# Patient Record
Sex: FEMALE | Race: WHITE | NOT HISPANIC OR LATINO | Employment: OTHER | URBAN - NONMETROPOLITAN AREA
[De-identification: names, ages, dates, MRNs, and addresses within clinical notes are randomized per-mention and may not be internally consistent; named-entity substitution may affect disease eponyms.]

---

## 2023-11-30 ENCOUNTER — TELEPHONE (OUTPATIENT)
Dept: CARDIOLOGY | Facility: CLINIC | Age: 68
End: 2023-11-30
Payer: COMMERCIAL

## 2023-11-30 PROBLEM — I48.0 PAROXYSMAL ATRIAL FIBRILLATION (MULTI): Status: ACTIVE | Noted: 2023-11-30

## 2023-11-30 PROBLEM — Z79.899 HIGH RISK MEDICATION USE: Status: ACTIVE | Noted: 2023-11-30

## 2023-11-30 PROBLEM — E78.2 MIXED HYPERLIPIDEMIA: Status: ACTIVE | Noted: 2023-11-30

## 2023-11-30 RX ORDER — RIVAROXABAN 20 MG/1
20 TABLET, FILM COATED ORAL DAILY
COMMUNITY
Start: 2023-11-10 | End: 2024-06-07 | Stop reason: SDUPTHER

## 2023-11-30 RX ORDER — METOPROLOL SUCCINATE 25 MG/1
25 TABLET, EXTENDED RELEASE ORAL DAILY
COMMUNITY
Start: 2023-06-03 | End: 2024-06-07 | Stop reason: SDUPTHER

## 2023-11-30 RX ORDER — ATORVASTATIN CALCIUM 40 MG/1
40 TABLET, FILM COATED ORAL DAILY
COMMUNITY
End: 2024-06-07 | Stop reason: SDUPTHER

## 2023-11-30 RX ORDER — ESCITALOPRAM OXALATE 20 MG/1
20 TABLET ORAL
COMMUNITY
Start: 2023-09-06

## 2023-11-30 RX ORDER — TRAZODONE HYDROCHLORIDE 50 MG/1
50 TABLET ORAL NIGHTLY
COMMUNITY
Start: 2023-09-06

## 2023-11-30 NOTE — TELEPHONE ENCOUNTER
Patient called and states that she is scheduled for placement of tubes in her ears  on 12/20/2023 and is asking if okay to hold her Xarelto 5 days prior.     TO Dr. Alton Serrano MD

## 2024-06-07 ENCOUNTER — OFFICE VISIT (OUTPATIENT)
Dept: CARDIOLOGY | Facility: CLINIC | Age: 69
End: 2024-06-07
Payer: COMMERCIAL

## 2024-06-07 VITALS
SYSTOLIC BLOOD PRESSURE: 120 MMHG | HEIGHT: 66 IN | WEIGHT: 164 LBS | BODY MASS INDEX: 26.36 KG/M2 | HEART RATE: 62 BPM | DIASTOLIC BLOOD PRESSURE: 88 MMHG

## 2024-06-07 DIAGNOSIS — E78.2 MIXED HYPERLIPIDEMIA: ICD-10-CM

## 2024-06-07 DIAGNOSIS — I48.0 PAROXYSMAL ATRIAL FIBRILLATION (MULTI): Primary | ICD-10-CM

## 2024-06-07 PROCEDURE — 3008F BODY MASS INDEX DOCD: CPT | Performed by: INTERNAL MEDICINE

## 2024-06-07 PROCEDURE — 1159F MED LIST DOCD IN RCRD: CPT | Performed by: INTERNAL MEDICINE

## 2024-06-07 PROCEDURE — 99213 OFFICE O/P EST LOW 20 MIN: CPT | Performed by: INTERNAL MEDICINE

## 2024-06-07 PROCEDURE — 1036F TOBACCO NON-USER: CPT | Performed by: INTERNAL MEDICINE

## 2024-06-07 RX ORDER — METOPROLOL SUCCINATE 25 MG/1
25 TABLET, EXTENDED RELEASE ORAL DAILY
Qty: 90 TABLET | Refills: 3 | Status: SHIPPED | OUTPATIENT
Start: 2024-06-07 | End: 2025-06-07

## 2024-06-07 RX ORDER — ATORVASTATIN CALCIUM 40 MG/1
40 TABLET, FILM COATED ORAL DAILY
Qty: 90 TABLET | Refills: 3 | Status: SHIPPED | OUTPATIENT
Start: 2024-06-07 | End: 2025-06-07

## 2024-06-07 RX ORDER — RIVAROXABAN 20 MG/1
20 TABLET, FILM COATED ORAL DAILY
Qty: 90 TABLET | Refills: 3 | Status: SHIPPED | OUTPATIENT
Start: 2024-06-07 | End: 2025-06-07

## 2024-06-07 NOTE — LETTER
"June 7, 2024     Marti Ashford MD  [address] [facility] [address]    Patient: Karime Nascimento   YOB: 1955   Date of Visit: 6/7/2024       Dear Dr. Marti Ashford MD:    Thank you for referring Karime Nascimento to me for evaluation. Below are my notes for this consultation.  If you have questions, please do not hesitate to call me. I look forward to following your patient along with you.       Sincerely,     Alton Serrano MD      CC: No Recipients  ______________________________________________________________________________________    Subjective   Karime Nascimento is a 69 y.o. female       Chief Complaint    Follow-up          HPI     Patient returns in follow-up of problems as noted.  She is done well.  She states she has had 4 episodes of atrial fibrillation in the last year.  Each 1 only lasted 30 to 60 minutes.  They were minimally symptomatic.  Because of the brief duration and infrequent nature of these events I recommend continued treatment as before.  Advised her that if the episodes are more frequent or longer in duration we will consider the implementation of antiarrhythmic therapy and this concept was briefly touched upon.  She is tolerating medical therapy well and treatment for lipids is well as the anticoagulant therapy and has no complaints or side effects.    She recently saw the dentist to suggested that there might be carotid disease.  She is low risk for carotid disease and more importantly has no bruit or other findings suggestive of this phenomenon.  Because of this I recommend no testing.        Vitals:    06/07/24 1046   BP: 120/88   BP Location: Left arm   Patient Position: Sitting   Pulse: 62   Weight: 74.4 kg (164 lb)   Height: 1.676 m (5' 6\")        Objective   Physical Exam  Constitutional:       Appearance: Normal appearance.   HENT:      Nose: Nose normal.   Neck:      Vascular: No carotid bruit.   Cardiovascular:      Rate and Rhythm: Normal rate.      " Pulses: Normal pulses.      Heart sounds: Normal heart sounds.   Pulmonary:      Effort: Pulmonary effort is normal.   Abdominal:      General: Bowel sounds are normal.      Palpations: Abdomen is soft.   Musculoskeletal:         General: Normal range of motion.      Cervical back: Normal range of motion.      Right lower leg: No edema.      Left lower leg: No edema.   Skin:     General: Skin is warm and dry.   Neurological:      General: No focal deficit present.      Mental Status: She is alert.   Psychiatric:         Mood and Affect: Mood normal.         Behavior: Behavior normal.         Thought Content: Thought content normal.         Judgment: Judgment normal.         Allergies  Patient has no known allergies.     Current Medications    Current Outpatient Medications:   •  atorvastatin (Lipitor) 40 mg tablet, Take 1 tablet (40 mg) by mouth once daily., Disp: , Rfl:   •  escitalopram (Lexapro) 20 mg tablet, Take 1 tablet (20 mg) by mouth once daily in the morning. Take before meals., Disp: , Rfl:   •  metoprolol succinate XL (Toprol-XL) 25 mg 24 hr tablet, Take 1 tablet (25 mg) by mouth once daily., Disp: , Rfl:   •  traZODone (Desyrel) 50 mg tablet, Take 1 tablet (50 mg) by mouth once daily at bedtime. As needed, Disp: , Rfl:   •  Xarelto 20 mg tablet, Take 1 tablet (20 mg) by mouth once daily., Disp: , Rfl:                      Assessment/Plan   1. Paroxysmal atrial fibrillation (Multi)  Rare episodes.  For now rate control with anticoagulant therapy is recommended.    2. Mixed hyperlipidemia  Review of treatment strategy demonstrates good control    3. BMI 26.0-26.9,adult  The merits of a modest diet were advocated      Scribe Attestation  By signing my name below, Gilda MYERS LPN, Scribregan   attest that this documentation has been prepared under the direction and in the presence of Alton Serrano MD.     Provider Attestation - Scribe documentation    All medical record entries made by the Scribe were at  my direction and personally dictated by me. I have reviewed the chart and agree that the record accurately reflects my personal performance of the history, physical exam, discussion and plan.

## 2024-06-07 NOTE — PROGRESS NOTES
"Subjective   Karime Nascimento is a 69 y.o. female       Chief Complaint    Follow-up          HPI     Patient returns in follow-up of problems as noted.  She is done well.  She states she has had 4 episodes of atrial fibrillation in the last year.  Each 1 only lasted 30 to 60 minutes.  They were minimally symptomatic.  Because of the brief duration and infrequent nature of these events I recommend continued treatment as before.  Advised her that if the episodes are more frequent or longer in duration we will consider the implementation of antiarrhythmic therapy and this concept was briefly touched upon.  She is tolerating medical therapy well and treatment for lipids is well as the anticoagulant therapy and has no complaints or side effects.    She recently saw the dentist to suggested that there might be carotid disease.  She is low risk for carotid disease and more importantly has no bruit or other findings suggestive of this phenomenon.  Because of this I recommend no testing.        Vitals:    06/07/24 1046   BP: 120/88   BP Location: Left arm   Patient Position: Sitting   Pulse: 62   Weight: 74.4 kg (164 lb)   Height: 1.676 m (5' 6\")        Objective   Physical Exam  Constitutional:       Appearance: Normal appearance.   HENT:      Nose: Nose normal.   Neck:      Vascular: No carotid bruit.   Cardiovascular:      Rate and Rhythm: Normal rate.      Pulses: Normal pulses.      Heart sounds: Normal heart sounds.   Pulmonary:      Effort: Pulmonary effort is normal.   Abdominal:      General: Bowel sounds are normal.      Palpations: Abdomen is soft.   Musculoskeletal:         General: Normal range of motion.      Cervical back: Normal range of motion.      Right lower leg: No edema.      Left lower leg: No edema.   Skin:     General: Skin is warm and dry.   Neurological:      General: No focal deficit present.      Mental Status: She is alert.   Psychiatric:         Mood and Affect: Mood normal.         Behavior: " Behavior normal.         Thought Content: Thought content normal.         Judgment: Judgment normal.         Allergies  Patient has no known allergies.     Current Medications    Current Outpatient Medications:     atorvastatin (Lipitor) 40 mg tablet, Take 1 tablet (40 mg) by mouth once daily., Disp: , Rfl:     escitalopram (Lexapro) 20 mg tablet, Take 1 tablet (20 mg) by mouth once daily in the morning. Take before meals., Disp: , Rfl:     metoprolol succinate XL (Toprol-XL) 25 mg 24 hr tablet, Take 1 tablet (25 mg) by mouth once daily., Disp: , Rfl:     traZODone (Desyrel) 50 mg tablet, Take 1 tablet (50 mg) by mouth once daily at bedtime. As needed, Disp: , Rfl:     Xarelto 20 mg tablet, Take 1 tablet (20 mg) by mouth once daily., Disp: , Rfl:                      Assessment/Plan   1. Paroxysmal atrial fibrillation (Multi)  Rare episodes.  For now rate control with anticoagulant therapy is recommended.    2. Mixed hyperlipidemia  Review of treatment strategy demonstrates good control    3. BMI 26.0-26.9,adult  The merits of a modest diet were advocated      Scribe Attestation  By signing my name below, IGilda LPN, Scribe   attest that this documentation has been prepared under the direction and in the presence of Alton Serrano MD.     Provider Attestation - Scribe documentation    All medical record entries made by the Scribe were at my direction and personally dictated by me. I have reviewed the chart and agree that the record accurately reflects my personal performance of the history, physical exam, discussion and plan.

## 2024-07-02 ENCOUNTER — APPOINTMENT (OUTPATIENT)
Dept: CARDIOLOGY | Facility: CLINIC | Age: 69
End: 2024-07-02
Payer: COMMERCIAL

## 2025-04-09 DIAGNOSIS — I48.0 PAROXYSMAL ATRIAL FIBRILLATION (MULTI): ICD-10-CM

## 2025-04-09 RX ORDER — METOPROLOL SUCCINATE 25 MG/1
25 TABLET, EXTENDED RELEASE ORAL DAILY
Qty: 90 TABLET | Refills: 3 | Status: SHIPPED | OUTPATIENT
Start: 2025-04-09 | End: 2026-04-09

## 2025-04-09 RX ORDER — RIVAROXABAN 20 MG/1
20 TABLET, FILM COATED ORAL DAILY
Qty: 90 TABLET | Refills: 3 | Status: SHIPPED | OUTPATIENT
Start: 2025-04-09 | End: 2026-04-09

## 2025-06-26 ENCOUNTER — APPOINTMENT (OUTPATIENT)
Dept: CARDIOLOGY | Facility: CLINIC | Age: 70
End: 2025-06-26
Payer: COMMERCIAL

## 2025-06-27 ENCOUNTER — APPOINTMENT (OUTPATIENT)
Dept: CARDIOLOGY | Facility: CLINIC | Age: 70
End: 2025-06-27
Payer: COMMERCIAL

## 2025-06-27 VITALS
DIASTOLIC BLOOD PRESSURE: 74 MMHG | WEIGHT: 136 LBS | HEART RATE: 84 BPM | BODY MASS INDEX: 21.86 KG/M2 | HEIGHT: 66 IN | SYSTOLIC BLOOD PRESSURE: 130 MMHG

## 2025-06-27 DIAGNOSIS — E78.2 MIXED HYPERLIPIDEMIA: ICD-10-CM

## 2025-06-27 DIAGNOSIS — Z79.01 ANTICOAGULATED: ICD-10-CM

## 2025-06-27 DIAGNOSIS — I48.0 PAROXYSMAL ATRIAL FIBRILLATION (MULTI): Primary | ICD-10-CM

## 2025-06-27 DIAGNOSIS — Z78.9 NEVER SMOKED TOBACCO: ICD-10-CM

## 2025-06-27 PROBLEM — Z79.899 HIGH RISK MEDICATION USE: Status: RESOLVED | Noted: 2023-11-30 | Resolved: 2025-06-27

## 2025-06-27 PROCEDURE — 93000 ELECTROCARDIOGRAM COMPLETE: CPT | Performed by: INTERNAL MEDICINE

## 2025-06-27 PROCEDURE — 1159F MED LIST DOCD IN RCRD: CPT | Performed by: INTERNAL MEDICINE

## 2025-06-27 PROCEDURE — 1036F TOBACCO NON-USER: CPT | Performed by: INTERNAL MEDICINE

## 2025-06-27 PROCEDURE — 1160F RVW MEDS BY RX/DR IN RCRD: CPT | Performed by: INTERNAL MEDICINE

## 2025-06-27 PROCEDURE — 99213 OFFICE O/P EST LOW 20 MIN: CPT | Performed by: INTERNAL MEDICINE

## 2025-06-27 PROCEDURE — 3008F BODY MASS INDEX DOCD: CPT | Performed by: INTERNAL MEDICINE

## 2025-06-27 RX ORDER — GLUCOSAM/CHONDRO/HERB 149/HYAL 750-100 MG
1000 TABLET ORAL 2 TIMES DAILY
Qty: 180 CAPSULE | Refills: 3
Start: 2025-06-27 | End: 2026-06-27

## 2025-06-27 RX ORDER — METOPROLOL SUCCINATE 25 MG/1
25 TABLET, EXTENDED RELEASE ORAL DAILY
Qty: 90 TABLET | Refills: 3 | Status: SHIPPED | OUTPATIENT
Start: 2025-06-27 | End: 2026-06-27

## 2025-06-27 RX ORDER — ROSUVASTATIN CALCIUM 5 MG/1
5 TABLET, COATED ORAL
COMMUNITY
Start: 2025-05-29

## 2025-06-27 RX ORDER — RIVAROXABAN 20 MG/1
20 TABLET, FILM COATED ORAL DAILY
Qty: 90 TABLET | Refills: 3 | Status: SHIPPED | OUTPATIENT
Start: 2025-06-27 | End: 2026-06-27

## 2025-06-27 NOTE — LETTER
June 27, 2025     Marti Ashford MD  [address] [facility] [address]    Patient: Karime Nascimento   YOB: 1955   Date of Visit: 6/27/2025       Dear Dr. Marti Ashford MD:    Thank you for referring Karime Nascimento to me for evaluation. Below are my notes for this consultation.  If you have questions, please do not hesitate to call me. I look forward to following your patient along with you.       Sincerely,     Jason Chamberlain MD      CC: No Recipients  ______________________________________________________________________________________    Chief Complaint   Patient presents with   • Annual Exam     1 year Follow up for Atrial Fibrillation         Subjective   Karime Nascimento is a 70 y.o. female     HPI   Patient is here for follow-up continue management for history of paroxysmal atrial fibrillation and long-term anticoagulation.  She is former patient of Dr. Serrano.  She reported few episodes of paroxysmal atrial fibrillation in the past.  She was placed on low-dose metoprolol and Xarelto.  Since last time she was seen she denies any cardiac complaints chest pain, palpitation, lightheadedness, dizziness or syncope.  Her smart watch failed to demonstrate any arrhythmia.  Previous lab work and cardiac testing noted and reviewed with her.    Assessment    1.  Paroxysmal atrial fibrillation no recent recurrence CHADS2 vascular score is 2 on long-term anticoagulation.  She is on low-dose metoprolol with no recent recurrence  2.  Long-term anticoagulation with Xarelto well-tolerated  3.  BMI recent weight loss BMI down to 21  4.  Hyperlipidemia she was switched recently to rosuvastatin by her PCP    Plan    1.  I advised the patient to continue present medical regimen  2.  The natural history of atrial fibrillation discussed with her at length  3.  I reviewed with her previous cardiac workup  4.  Patient was asked me about the health benefit of fish oil she is interested in taking fish oil  "considering she does not eat seafood or fish I advised her there is some data about the potential benefit of fish oil 2000 mg daily  5.  I will see her back in 7-8 months and I advised her to notify me with change in cardiac status or symptoms  Review of Systems   All other systems reviewed and are negative.           Vitals:    06/27/25 1103   BP: 130/74   BP Location: Left arm   Patient Position: Sitting   Pulse: 84   Weight: 61.7 kg (136 lb)   Height: 1.676 m (5' 6\")      EKG done in office today      Objective   Physical Exam  Constitutional:       Appearance: Normal appearance.   HENT:      Nose: Nose normal.   Neck:      Vascular: No carotid bruit.   Cardiovascular:      Rate and Rhythm: Normal rate.      Pulses: Normal pulses.      Heart sounds: Normal heart sounds.   Pulmonary:      Effort: Pulmonary effort is normal.   Abdominal:      General: Bowel sounds are normal.      Palpations: Abdomen is soft.   Musculoskeletal:         General: Normal range of motion.      Cervical back: Normal range of motion.      Right lower leg: No edema.      Left lower leg: No edema.   Skin:     General: Skin is warm and dry.   Neurological:      General: No focal deficit present.      Mental Status: She is alert.   Psychiatric:         Mood and Affect: Mood normal.         Behavior: Behavior normal.         Thought Content: Thought content normal.         Judgment: Judgment normal.         Allergies  Patient has no known allergies.     Current Medications  Current Outpatient Medications   Medication Instructions   • atorvastatin (LIPITOR) 40 mg, oral, Daily   • escitalopram (LEXAPRO) 20 mg, Daily before breakfast   • metoprolol succinate XL (TOPROL-XL) 25 mg, oral, Daily   • omega 3-dha-epa-fish oil (Fish OiL) 1,000 (120-180) mg capsule 1,000 mg, oral, 2 times daily   • rosuvastatin (CRESTOR) 5 mg, Daily RT   • traZODone (DESYREL) 50 mg, Nightly   • Xarelto 20 mg, oral, Daily                        Assessment/Plan   1. " Paroxysmal atrial fibrillation (Multi)  Follow Up In Cardiology    ECG 12 Lead    Follow Up In Cardiology    metoprolol succinate XL (Toprol-XL) 25 mg 24 hr tablet    Xarelto 20 mg tablet      2. Anticoagulated        3. Mixed hyperlipidemia  omega 3-dha-epa-fish oil (Fish OiL) 1,000 (120-180) mg capsule      4. BMI 21.0-21.9, adult        5. Never smoked tobacco                 Scribe Attestation  By signing my name below, IAbbey LPN   , Scribe   attest that this documentation has been prepared under the direction and in the presence of Jason Chamberlain MD.     Provider Attestation - Scribe documentation    All medical record entries made by the Scribe were at my direction and personally dictated by me. I have reviewed the chart and agree that the record accurately reflects my personal performance of the history, physical exam, discussion and plan.

## 2025-06-27 NOTE — PROGRESS NOTES
"Chief Complaint   Patient presents with    Annual Exam     1 year Follow up for Atrial Fibrillation         Subjective   Karime Nascimento is a 70 y.o. female     HPI   Patient is here for follow-up continue management for history of paroxysmal atrial fibrillation and long-term anticoagulation.  She is former patient of Dr. Serrano.  She reported few episodes of paroxysmal atrial fibrillation in the past.  She was placed on low-dose metoprolol and Xarelto.  Since last time she was seen she denies any cardiac complaints chest pain, palpitation, lightheadedness, dizziness or syncope.  Her smart watch failed to demonstrate any arrhythmia.  Previous lab work and cardiac testing noted and reviewed with her.    Assessment    1.  Paroxysmal atrial fibrillation no recent recurrence CHADS2 vascular score is 2 on long-term anticoagulation.  She is on low-dose metoprolol with no recent recurrence  2.  Long-term anticoagulation with Xarelto well-tolerated  3.  BMI recent weight loss BMI down to 21  4.  Hyperlipidemia she was switched recently to rosuvastatin by her PCP    Plan    1.  I advised the patient to continue present medical regimen  2.  The natural history of atrial fibrillation discussed with her at length  3.  I reviewed with her previous cardiac workup  4.  Patient was asked me about the health benefit of fish oil she is interested in taking fish oil considering she does not eat seafood or fish I advised her there is some data about the potential benefit of fish oil 2000 mg daily  5.  I will see her back in 7-8 months and I advised her to notify me with change in cardiac status or symptoms  Review of Systems   All other systems reviewed and are negative.           Vitals:    06/27/25 1103   BP: 130/74   BP Location: Left arm   Patient Position: Sitting   Pulse: 84   Weight: 61.7 kg (136 lb)   Height: 1.676 m (5' 6\")      EKG done in office today      Objective   Physical Exam  Constitutional:       Appearance: Normal " appearance.   HENT:      Nose: Nose normal.   Neck:      Vascular: No carotid bruit.   Cardiovascular:      Rate and Rhythm: Normal rate.      Pulses: Normal pulses.      Heart sounds: Normal heart sounds.   Pulmonary:      Effort: Pulmonary effort is normal.   Abdominal:      General: Bowel sounds are normal.      Palpations: Abdomen is soft.   Musculoskeletal:         General: Normal range of motion.      Cervical back: Normal range of motion.      Right lower leg: No edema.      Left lower leg: No edema.   Skin:     General: Skin is warm and dry.   Neurological:      General: No focal deficit present.      Mental Status: She is alert.   Psychiatric:         Mood and Affect: Mood normal.         Behavior: Behavior normal.         Thought Content: Thought content normal.         Judgment: Judgment normal.         Allergies  Patient has no known allergies.     Current Medications  Current Outpatient Medications   Medication Instructions    atorvastatin (LIPITOR) 40 mg, oral, Daily    escitalopram (LEXAPRO) 20 mg, Daily before breakfast    metoprolol succinate XL (TOPROL-XL) 25 mg, oral, Daily    omega 3-dha-epa-fish oil (Fish OiL) 1,000 (120-180) mg capsule 1,000 mg, oral, 2 times daily    rosuvastatin (CRESTOR) 5 mg, Daily RT    traZODone (DESYREL) 50 mg, Nightly    Xarelto 20 mg, oral, Daily                        Assessment/Plan   1. Paroxysmal atrial fibrillation (Multi)  Follow Up In Cardiology    ECG 12 Lead    Follow Up In Cardiology    metoprolol succinate XL (Toprol-XL) 25 mg 24 hr tablet    Xarelto 20 mg tablet      2. Anticoagulated        3. Mixed hyperlipidemia  omega 3-dha-epa-fish oil (Fish OiL) 1,000 (120-180) mg capsule      4. BMI 21.0-21.9, adult        5. Never smoked tobacco                 Scribe Attestation  By signing my name below, Abbey MYERS LPN   , Scribe   attest that this documentation has been prepared under the direction and in the presence of Jason Chamberlain MD.     Provider  Attestation - Scribe documentation    All medical record entries made by the Scribe were at my direction and personally dictated by me. I have reviewed the chart and agree that the record accurately reflects my personal performance of the history, physical exam, discussion and plan.

## 2025-06-27 NOTE — PATIENT INSTRUCTIONS
Please bring all medicines, vitamins, and herbal supplements with you when you come to the office.    Prescriptions will not be filled unless you are compliant with your follow up appointments or have a follow up appointment scheduled as per instruction of your physician. Refills should be requested at the time of your visit.     Fish oil  8 months with gillian

## 2026-02-06 ENCOUNTER — APPOINTMENT (OUTPATIENT)
Dept: CARDIOLOGY | Facility: CLINIC | Age: 71
End: 2026-02-06
Payer: COMMERCIAL